# Patient Record
Sex: FEMALE
[De-identification: names, ages, dates, MRNs, and addresses within clinical notes are randomized per-mention and may not be internally consistent; named-entity substitution may affect disease eponyms.]

---

## 2017-03-06 ENCOUNTER — APPOINTMENT (OUTPATIENT)
Dept: OBGYN | Facility: CLINIC | Age: 77
End: 2017-03-06

## 2018-08-09 ENCOUNTER — APPOINTMENT (OUTPATIENT)
Dept: OBGYN | Facility: CLINIC | Age: 78
End: 2018-08-09
Payer: MEDICARE

## 2018-08-09 VITALS
DIASTOLIC BLOOD PRESSURE: 90 MMHG | WEIGHT: 205 LBS | HEIGHT: 64 IN | SYSTOLIC BLOOD PRESSURE: 130 MMHG | BODY MASS INDEX: 35 KG/M2

## 2018-08-09 DIAGNOSIS — Z01.419 ENCOUNTER FOR GYNECOLOGICAL EXAMINATION (GENERAL) (ROUTINE) W/OUT ABNORMAL FINDINGS: ICD-10-CM

## 2018-08-09 DIAGNOSIS — E66.3 OVERWEIGHT: ICD-10-CM

## 2018-08-09 DIAGNOSIS — E78.00 PURE HYPERCHOLESTEROLEMIA, UNSPECIFIED: ICD-10-CM

## 2018-08-09 PROCEDURE — 99397 PER PM REEVAL EST PAT 65+ YR: CPT

## 2019-07-08 ENCOUNTER — OTHER (OUTPATIENT)
Age: 79
End: 2019-07-08

## 2019-08-29 ENCOUNTER — APPOINTMENT (OUTPATIENT)
Dept: OBGYN | Facility: CLINIC | Age: 79
End: 2019-08-29

## 2019-12-19 ENCOUNTER — APPOINTMENT (OUTPATIENT)
Dept: OBGYN | Facility: CLINIC | Age: 79
End: 2019-12-19

## 2020-11-19 ENCOUNTER — APPOINTMENT (OUTPATIENT)
Dept: OBGYN | Facility: CLINIC | Age: 80
End: 2020-11-19

## 2020-12-03 ENCOUNTER — APPOINTMENT (OUTPATIENT)
Dept: OBGYN | Facility: CLINIC | Age: 80
End: 2020-12-03

## 2020-12-16 PROBLEM — Z01.419 ENCOUNTER FOR GYNECOLOGICAL EXAMINATION WITH PAPANICOLAOU SMEAR OF CERVIX: Status: RESOLVED | Noted: 2018-08-09 | Resolved: 2020-12-16

## 2021-09-23 ENCOUNTER — APPOINTMENT (OUTPATIENT)
Dept: OBGYN | Facility: CLINIC | Age: 81
End: 2021-09-23

## 2021-10-27 ENCOUNTER — APPOINTMENT (OUTPATIENT)
Dept: ORTHOPEDIC SURGERY | Facility: CLINIC | Age: 81
End: 2021-10-27
Payer: MEDICARE

## 2021-10-27 VITALS
WEIGHT: 200 LBS | HEIGHT: 64 IN | BODY MASS INDEX: 34.15 KG/M2 | HEART RATE: 91 BPM | DIASTOLIC BLOOD PRESSURE: 92 MMHG | SYSTOLIC BLOOD PRESSURE: 145 MMHG

## 2021-10-27 DIAGNOSIS — M54.50 LOW BACK PAIN, UNSPECIFIED: ICD-10-CM

## 2021-10-27 DIAGNOSIS — M51.37 OTHER INTERVERTEBRAL DISC DEGENERATION, LUMBOSACRAL REGION: ICD-10-CM

## 2021-10-27 DIAGNOSIS — M16.0 BILATERAL PRIMARY OSTEOARTHRITIS OF HIP: ICD-10-CM

## 2021-10-27 DIAGNOSIS — M25.551 PAIN IN RIGHT HIP: ICD-10-CM

## 2021-10-27 PROCEDURE — 72100 X-RAY EXAM L-S SPINE 2/3 VWS: CPT

## 2021-10-27 PROCEDURE — 73564 X-RAY EXAM KNEE 4 OR MORE: CPT | Mod: LT

## 2021-10-27 PROCEDURE — 99204 OFFICE O/P NEW MOD 45 MIN: CPT

## 2021-10-27 PROCEDURE — 73522 X-RAY EXAM HIPS BI 3-4 VIEWS: CPT

## 2021-10-27 NOTE — DISCUSSION/SUMMARY
[de-identified] : This time the patient is managing with her low back and managing with the discomfort it gives her her knees are doing extremely well and she has no pain in either knee and they function well.  Her left and right hips have arthritis although on x-ray the left hip may look more severe she has more symptoms and less motion in the right hip she has a medial type arthritis.  At this time she would like to stay on conservative measures and she is taking Aleve 1-2 twice a day and will take Tylenol when necessary with that.  She will return for follow-up in 3 to 6 months.  In the future she may well benefit from total hip replacement.

## 2021-10-27 NOTE — PHYSICAL EXAM
[de-identified] : Constitutional - the patient is of normal build and overweight with a BMI of 34.  The patient remains oriented to person, time, and  place.  Mood is normal. Vital signs as recorded.  The patients gait is WNL.  He comes in with her .  The patient has satisfactory  balance and can stand on toes and heels.\par \par The patient has no difficulty with respiration. Respiration at rest is a normal rate. The patient is not short of breath and has not become short of breath with short ambulation. There is no audible wheezing. No coughing.\par \par Skin is normal for the patient's age. There are no abnormal masses or lymph nodes which stand out in the lower extremities.\par \par Spine - deep tendon reflexes are symmetric. Motor and sensory are symmetric.  Does have a history however of low back pain intermittently she is managing well with it conservatively.\par \par \par UPPER EXTREMITIES \par \par Shoulders ROM  is symmetric  and the motion is satisfactory.  She is slightly stiff for 2 full flexion and abduction with her right shoulder as compared to the left the right shoulder does go to 160 degrees left goes past 170 degrees.  There is no significant shoulder pain or limitation in motion which would make using a cane or a walker difficult. Shoulder stability and  strength are satisfactory.\par \par There is normal motion in the wrists and elbows.\par \par Circulation appears satisfactory with pedal pulses present.  There is no major edema in the lower legs. No skin tenderness or increased temperature. No major varicosities.\par \par HIP EXAMINATION the abduction and abduction as well as rotation measurements were taken with the hip in flexion.\par Her gait shows she limps off her right leg.  The discomfort is in her right groin.\par Motion\par She has flexion right hip 115 degrees left hip 120 degrees, abduction right hip 40 degrees left hip 70 degrees, adduction right hip 20 degrees left hip 20 degrees, external rotation right hip 40 degrees left hip 60 degrees, internal rotation right hip 0 degrees left hip 10 degrees.  The discomfort is with motion in her right hip.  Although she knows she has some arthritis on the left she is not having pain in her left groin at this time.  She guards the strength in her right hip.  She has good strength on the left.\par \par \par There is no crepitus in either hip. The alignment of the hips is normal.\par \par \par KNEE EXAMINATION\par \par Motion has bilateral total knee replacements.\par Right Knee has 0 to 130 degrees of motion with good medial  lateral and anterior posterior stability.  There is no major effusion.  There is no Baker's cyst.  There is no significant patellofemoral crepitus.  The patient has satisfactory strength across the knee.               \par Left  Knee   has 0 to 130 degrees of motion with good medial lateral and anterior posterior stability.  There is no major effusion there is no Baker's cyst.  There is no significant patellofemoral crepitus.  The patient has satisfactory strength across the knee.            \par \par Ankle and foot examination\par Of the ankle has normal motion.  There is normal ankle stability.  The patient has no major abnormalities of the foot.\par \par \par \par  [de-identified] : AP of the pelvis and lateral both the right and left hips show that the left femoral head although it is round shows the degenerative changes more of the medial type with increased subchondral sclerosis superiorly it appears to be a slightly shortened head neck area with a small peripheral osteophyte at the head neck junction.  Her right hip shows a medial type arthritis with her medial joint space narrowed she has the same amount of joint space superiorly on the right as the left but she also has in this hip also some increased subchondral sclerosis of the acetabulum.  Impression bilateral osteoarthritis of the hips.\par \par 4 views of both the right and left knees show bilateral cemented [employer] total knee replacements they are in excellent position good polyethylene spaces are maintained and there is no evidence of loosening or osteolysis.\par \par An AP and lateral of the patient's lumbar spine shows severe degenerative disc disease at all lumbar disc levels with peripheral osteophytes especially L2–3, L3-4, L4-5, that can be seen easily on the AP view with a slight curvature with a convexity to the right.

## 2022-10-27 ENCOUNTER — NON-APPOINTMENT (OUTPATIENT)
Age: 82
End: 2022-10-27

## 2023-06-27 ENCOUNTER — APPOINTMENT (OUTPATIENT)
Dept: ORTHOPEDIC SURGERY | Facility: CLINIC | Age: 83
End: 2023-06-27
Payer: MEDICARE

## 2023-06-27 VITALS
DIASTOLIC BLOOD PRESSURE: 81 MMHG | HEART RATE: 84 BPM | SYSTOLIC BLOOD PRESSURE: 163 MMHG | WEIGHT: 196 LBS | HEIGHT: 64 IN | BODY MASS INDEX: 33.46 KG/M2

## 2023-06-27 DIAGNOSIS — Z96.651 PRESENCE OF RIGHT ARTIFICIAL KNEE JOINT: ICD-10-CM

## 2023-06-27 DIAGNOSIS — M16.11 UNILATERAL PRIMARY OSTEOARTHRITIS, RIGHT HIP: ICD-10-CM

## 2023-06-27 DIAGNOSIS — Z96.652 PRESENCE OF LEFT ARTIFICIAL KNEE JOINT: ICD-10-CM

## 2023-06-27 DIAGNOSIS — M16.12 UNILATERAL PRIMARY OSTEOARTHRITIS, LEFT HIP: ICD-10-CM

## 2023-06-27 PROCEDURE — 73564 X-RAY EXAM KNEE 4 OR MORE: CPT | Mod: 50

## 2023-06-27 PROCEDURE — 20610 DRAIN/INJ JOINT/BURSA W/O US: CPT | Mod: RT

## 2023-06-27 PROCEDURE — 73522 X-RAY EXAM HIPS BI 3-4 VIEWS: CPT

## 2023-06-27 PROCEDURE — 99214 OFFICE O/P EST MOD 30 MIN: CPT | Mod: 25

## 2023-06-27 NOTE — DISCUSSION/SUMMARY
[de-identified] : This time the patient is managing with her low back and managing with the discomfort it gives her her knees are doing extremely well and she has no pain in either knee and they function well.  Her left and right hips have arthritis although on x-ray the left hip may look more severe she has more symptoms and less motion in the right hip she has a medial type arthritis.  At this time she would like to stay on conservative measures and she is taking Aleve 1-2 twice a day and will take Tylenol when necessary with that.  She will return for follow-up in 3 to 6 months.  In the future she may well benefit from total hip replacement.

## 2023-06-27 NOTE — PROCEDURE
[de-identified] : Procedure Note: \par \par Anatomic Location: Right hip\par \par Diagnosis: Right hip arthritis\par \par Procedure:  Injection of 6ccs of Marcaine 0.5% plain and Kenalog 40, 1 cc\par \par Local Spray: Ethyl Chloride.\par \par Skin preparation with alcohol.\par \par Patient has consented for the procedure.\par \par Injection 22-gauge spinal needle  through an anterior  lateral approach.\par \par Patient tolerated the procedure well.\par \par

## 2023-06-27 NOTE — HISTORY OF PRESENT ILLNESS
[de-identified] : Ms. ERNESTO PARKER is an 82 year female who returns to office complaining of right hip pain.\par She describes an intermittent posterior hip pain that is dull/achy in nature x 2 years with insidious onset.\par Denies specific injury, trauma, or fall.\par Pain is also present in her right groin at times.\par Pain is worsened with prolonged ambulation and going up and down stairs.\par Pain is relieved with rest and Aleve PRN pain.\par Although pain sometimes radiates down her right leg to her foot, she does not report any numbness/tingling/weakness of the leg.\par Patient is s/p left TKR 2013 and right TKR 2014 which are doing very well at this time.

## 2023-06-27 NOTE — PHYSICAL EXAM
[de-identified] : Constitutional - the patient is of normal build and overweight with a BMI of 33.64.  The patient remains oriented to person, time, and  place.  Mood is normal. Vital signs as recorded.  She comes in with her .  The patient has satisfactory  balance and can stand on toes and heels.\par \par \par Spine - deep tendon reflexes are symmetric. Motor and sensory are symmetric.  Does have a history however of low back pain intermittently she is managing well with it conservatively.  She does get some pain across her low back as she did before however she is having more discomfort now going into her right buttocks and into her right groin.\par \par Circulation appears satisfactory with pedal pulses present.  There is no major edema in the lower legs. No skin tenderness or increased temperature. No major varicosities.\par \par HIP EXAMINATION the abduction and abduction as well as rotation measurements were taken with the hip in flexion.\par Her gait shows she limps off her right leg.  The discomfort is in her right groin.\par Motion\par Motion on today's exam shows flexion right hip 100 degrees left hip 120 degrees, abduction right hip 30 degrees left hip 60 degrees, adduction right hip 0 degrees left hip 10 degrees, external rotation right hip 30 degrees left hip 50 degrees, internal rotation right hip -20 degrees left hip 10 degrees.  She does get discomfort with motion in the right hip.  \par \par  Although she knows she has some arthritis on the left she is not having pain in her left groin at this time.  She guards the strength in her right hip.  She has good strength on the left.\par \par \par There is no crepitus in either hip. The alignment of the hips is normal.\par \par She has right and left total knee replacements both of which are doing extremely well with full extension to 130 degrees of flexion good medial lateral and posterior stability.\par \par \par  [de-identified] : AP of the pelvis and lateral both the right and left hips show that the left femoral head although it is round shows the degenerative changes more of the medial type with increased subchondral sclerosis superiorly.  He does continue to have very small osteophyte at the posterior head neck junction.\par \par Her right hip shows a medial type arthritis with her medial joint space significantly narrowed.  Peers that there is more medial wear in the right hip than the left..  Impression bilateral osteoarthritis of the hips.\par \par 4 views of both the right and left knees show bilateral cemented [employer] total knee replacements they are in excellent position good polyethylene spaces are maintained and there is no evidence of loosening or osteolysis.\par \par

## 2024-06-18 ENCOUNTER — APPOINTMENT (OUTPATIENT)
Dept: ORTHOPEDIC SURGERY | Facility: CLINIC | Age: 84
End: 2024-06-18

## 2024-08-19 DIAGNOSIS — M85.80 OTHER SPECIFIED DISORDERS OF BONE DENSITY AND STRUCTURE, UNSPECIFIED SITE: ICD-10-CM

## 2024-08-27 ENCOUNTER — APPOINTMENT (OUTPATIENT)
Dept: ORTHOPEDIC SURGERY | Facility: CLINIC | Age: 84
End: 2024-08-27

## 2024-09-03 ENCOUNTER — APPOINTMENT (OUTPATIENT)
Dept: ORTHOPEDIC SURGERY | Facility: CLINIC | Age: 84
End: 2024-09-03

## 2024-10-01 ENCOUNTER — APPOINTMENT (OUTPATIENT)
Dept: ORTHOPEDIC SURGERY | Facility: CLINIC | Age: 84
End: 2024-10-01

## 2025-09-09 ENCOUNTER — APPOINTMENT (OUTPATIENT)
Dept: OBGYN | Facility: CLINIC | Age: 85
End: 2025-09-09